# Patient Record
(demographics unavailable — no encounter records)

---

## 2025-03-12 NOTE — PHYSICAL EXAM
[Alert] : alert [Normal Voice/Communication] : normal voice/communication [Healthy Appearing] : healthy appearing [No Acute Distress] : no acute distress [Sclera] : the sclera and conjunctiva were normal [Hearing Threshold Finger Rub Not [place]] : hearing was normal [Normal Lips/Gums] : the lips and gums were normal [Oropharynx] : the oropharynx was normal [Normal Appearance] : the appearance of the neck was normal [No Neck Mass] : no neck mass was observed [No Respiratory Distress] : no respiratory distress [No Acc Muscle Use] : no accessory muscle use [Respiration, Rhythm And Depth] : normal respiratory rhythm and effort [Auscultation Breath Sounds / Voice Sounds] : lungs were clear to auscultation bilaterally [Heart Rate And Rhythm] : heart rate was normal and rhythm regular [Normal S1, S2] : normal S1 and S2 [Murmurs] : no murmurs [None] : no edema [Bowel Sounds] : normal bowel sounds [Abdomen Tenderness] : non-tender [No Masses] : no abdominal mass palpated [Abdomen Soft] : soft [Cervical Lymph Nodes Enlarged Posterior Bilaterally] : no posterior cervical lymphadenopathy [Supraclavicular Lymph Nodes Enlarged Bilaterally] : no supraclavicular lymphadenopathy [No Spinal Tenderness] : no spinal tenderness [Abnormal Walk] : normal gait [No Clubbing, Cyanosis] : no clubbing or cyanosis of the fingernails [Normal Color / Pigmentation] : normal skin color and pigmentation [] : no rash [No Focal Deficits] : no focal deficits [Oriented To Time, Place, And Person] : oriented to person, place, and time

## 2025-03-12 NOTE — ASSESSMENT
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 I recommended to repeat liver enzymes and also abdominal sonogram was ordered Advised low-fat and starchy diet, regular cardio type physical exercise and red meat For anal discomfort and pain I prescribed Anusol suppositories advised high-fiber diet and plenty of fluids for now.  Follow-up in 1 to 2 months if no relief advised flexible sigmoidoscopy

## 2025-03-12 NOTE — HISTORY OF PRESENT ILLNESS
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 He also reported severe anal pain associated with defecation and continue aching sensation after the bowel movements for the past 5 weeks he denied any rectal bleeding or bloodstained stools and no external palpable nodules. Denied fever chills rigors nausea or vomitings Denies any nausea, vomiting, abdominal pain .fever or tiredness.He denied regular use of ETOH, substance abuse,new medications nor any OTC health food supplements. No hx of NSAID abuse.Hepatitis A,B and C serologies were negative.Denied any recent weight gain.No family history of liver disease I reviewed his LFTs which showed ALT of 101 1 year ago and 6 months later dropped to 48.  He had abdominal sonogram at [facility] which is reported as normal Patient's weight and BMI are as reported in Physical exam.He does not have any diabetes hyperlipidemia and physically active

## 2025-05-12 NOTE — ASSESSMENT
[FreeTextEntry1] : CPE: -will order routine lab work -medical history including surgical history, family history, and current medications reviewed and documented as above -Age appropriate screenings including but not limited to cancer screening, alcohol misuse (audit-c) and depression screening as documented above -patient counseled on healthy diet and lifestyle changes including increasing physical activity and eating a diet low in processed foods and high in fruits and vegetables -counseled patient on age appropriate vaccinations and immunization record updated  ADHD- using Adderall just as needed, refill sent istop Reference #: [id number]  Elevated lfts, unknown etiology - will repeat today [Vaccines Reviewed] : Immunizations reviewed today. Please see immunization details in the vaccine log within the immunization flowsheet.

## 2025-05-12 NOTE — HISTORY OF PRESENT ILLNESS
[FreeTextEntry1] : cpe [de-identified] : 36 y/o male with pmhx of ADHD presents for cpe. Patient overall doing well. 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

## 2025-05-12 NOTE — HEALTH RISK ASSESSMENT
[0] : 2) Feeling down, depressed, or hopeless: Not at all (0) [PHQ-2 Negative - No further assessment needed] : PHQ-2 Negative - No further assessment needed [Employed] : employed [] :  [No] : No [Audit-CScore] : 0 [[id number]] : 0 [Never] : Never

## 2025-06-19 NOTE — RISK ASSESSMENT
[No, patient denies ideation or behavior] : No, patient denies ideation or behavior [FreeTextEntry8] : Patient has a remote history of suicide attempt in 2011. During the intake with writer on 5/5/25, patient reported that, while he has intermittent thoughts of "wanting to disappear" he does not have a desire to die, and denied intent, plan, or engaging in preparatory behaviors. The patient feels confident in their ability to utilize resources should suicidal ideation arise.  [FreeTextEntry7] : The patient denied a lifetime history of homicide ideation, intent, plan, engaging in preparatory behaviors, and aggression.  [FreeTextEntry9] : On 5/5/25, the patient denied active suicidal ideation, intent, plan, or engaging in preparatory behavior. While there is a history of a suicide attempt in 2011, no subsequent attempts were reported. The patient is future-oriented and identifies reasons for living, including feeling connected to friends and his partner and being engaged in work, and he is motivated for treatment. Taken together, the patient is at low risk for suicide, homicide, and aggression at this time. [Low acute suicide risk] : Low acute suicide risk [No] : No [Not clinically indicated] : Safety Plan completed/updated (for individuals at risk): Not clinically indicated

## 2025-06-19 NOTE — REASON FOR VISIT
[Patient preference] : as per patient preference [Continuing, patient seen in-person within last 12 months] : Telehealth services are continuing as patient has been seen in-person within last 12 months. [Telehealth (audio & video) - Individual/Group] : This visit was provided via telehealth using real-time 2-way audio visual technology. [Other Location: e.g. Home (Enter Location, City,State)___] : The patient, [unfilled], was located at [unfilled] at the time of the visit. [Patient's space is appropriate for telehealth and maintains privacy/confidentiality.] : Patient's space is appropriate for telehealth and maintains privacy/confidentiality. [Participant(s) identity verified] : Participant(s) identity verified. [Patient] : Patient [FreeTextEntry4] : 10:00 AM [FreeTextEntry5] : 10:55 AM [FreeTextEntry1] : "To learn better coping skills and to go with the flow."

## 2025-06-19 NOTE — PLAN
[Integrative Therapy] : Integrative Therapy  [de-identified] : The session focused on the patient's sleep anxiety, specifically his concern about falling asleep to avoid fatigue at work, despite not experiencing daytime tiredness. The patient and writer reviewed his new work schedule and discussed strategies to connect with his emotional states. During the session, the patient appeared well-groomed and oriented, with adequate attention and concentration. The mood continued to be anxious, but with an appropriate affect; speech was clear and organized. A full-body scan meditation was introduced to address physiological arousal and anxious thoughts before sleeping. Additional sleep hygiene techniques were discussed, including avoiding TV in bed and establishing a 30-minute buffer period between evening activities and bedtime, such as watching TV or listening to music in the living room. Plan to continue exploring techniques that foster emotional connection and support anxiety management, focusing on integrating these practices into sleep routines and broader daily activities.  [Recommended Frequency of Visits: ____] : Recommended frequency of visits: [unfilled] [Return in ____ week(s)] : Return in [unfilled] week(s)

## 2025-06-19 NOTE — REASON FOR VISIT
[Patient preference] : as per patient preference [Continuing, patient seen in-person within last 12 months] : Telehealth services are continuing as patient has been seen in-person within last 12 months. [Telehealth (audio & video) - Individual/Group] : This visit was provided via telehealth using real-time 2-way audio visual technology. [Other Location: e.g. Home (Enter Location, City,State)___] : The patient, [unfilled], was located at [unfilled] at the time of the visit. [Patient's space is appropriate for telehealth and maintains privacy/confidentiality.] : Patient's space is appropriate for telehealth and maintains privacy/confidentiality. [Participant(s) identity verified] : Participant(s) identity verified. [FreeTextEntry4] : 1:00 PM [FreeTextEntry5] : 1:53 PM [Patient] : Patient [FreeTextEntry1] : "To learn better coping skills and to go with the flow."

## 2025-06-19 NOTE — PLAN
[Integrative Therapy] : Integrative Therapy  [de-identified] : The patient reported improved mood in today's session, attributing the shift to improved sleep hygiene and mindfulness activities, though noted increased fatigue over the past few days. Patient's affect matched the stated mood. The writer explored moments of inattention during the session, connecting early experiences of trauma to ongoing hyperactivity and impulse control, making space for the patient to connect perseverative thoughts to potential underlying emotions. The writer and patient will continue to explore themes of abandonment and rejection in subsequent sessions.   [Recommended Frequency of Visits: ____] : Recommended frequency of visits: [unfilled] [Return in ____ week(s)] : Return in [unfilled] week(s)

## 2025-06-19 NOTE — RISK ASSESSMENT
[No, patient denies ideation or behavior] : No, patient denies ideation or behavior [Low acute suicide risk] : Low acute suicide risk [No] : No [Not clinically indicated] : Safety Plan completed/updated (for individuals at risk): Not clinically indicated [FreeTextEntry8] : Patient has a remote history of suicide attempt in 2011. During the intake with writer on 5/5/25, patient reported that, while he has intermittent thoughts of "wanting to disappear" he does not have a desire to die, and denied intent, plan, or engaging in preparatory behaviors. The patient feels confident in their ability to utilize resources should suicidal ideation arise.  [FreeTextEntry7] : The patient denied a lifetime history of homicide ideation, intent, plan, engaging in preparatory behaviors, and aggression.  [FreeTextEntry9] : On 5/5/25, the patient denied active suicidal ideation, intent, plan, or engaging in preparatory behavior. While there is a history of a suicide attempt in 2011, no subsequent attempts were reported. The patient is future-oriented and identifies reasons for living, including feeling connected to friends and his partner and being engaged in work, and he is motivated for treatment. Taken together, the patient is at low risk for suicide, homicide, and aggression at this time.

## 2025-06-19 NOTE — END OF VISIT
[Duration of Psychotherapy Visit (minutes spent in synchronous communication): ____] : Duration of Psychotherapy Visit (minutes spent in synchronous communication): [unfilled] [Individual Psychotherapy for 53+ minutes] : Individual Psychotherapy for 53+ minutes  [Licensed Clinician] : Licensed Clinician Discharged

## 2025-06-19 NOTE — PHYSICAL EXAM
[Well groomed] : well groomed [Average] : average [Cooperative] : cooperative [Euthymic] : euthymic [Full] : full [Clear] : clear [Linear/Goal Directed] : linear/goal directed [None] : none [None Reported] : none reported [WNL] : within normal limits [Not applicable] : not applicable